# Patient Record
Sex: MALE | Race: OTHER | Employment: FULL TIME | URBAN - METROPOLITAN AREA
[De-identification: names, ages, dates, MRNs, and addresses within clinical notes are randomized per-mention and may not be internally consistent; named-entity substitution may affect disease eponyms.]

---

## 2017-03-03 ENCOUNTER — OFFICE VISIT (OUTPATIENT)
Dept: FAMILY MEDICINE CLINIC | Facility: CLINIC | Age: 42
End: 2017-03-03

## 2017-03-03 VITALS
SYSTOLIC BLOOD PRESSURE: 135 MMHG | BODY MASS INDEX: 30.87 KG/M2 | DIASTOLIC BLOOD PRESSURE: 90 MMHG | HEART RATE: 95 BPM | TEMPERATURE: 98 F | HEIGHT: 67.5 IN | WEIGHT: 199 LBS

## 2017-03-03 DIAGNOSIS — R59.9 REACTIVE LYMPHADENOPATHY: ICD-10-CM

## 2017-03-03 DIAGNOSIS — L73.0 ACNE KELOIDALIS NUCHAE: Primary | ICD-10-CM

## 2017-03-03 PROCEDURE — 99212 OFFICE O/P EST SF 10 MIN: CPT | Performed by: FAMILY MEDICINE

## 2017-03-03 PROCEDURE — 99202 OFFICE O/P NEW SF 15 MIN: CPT | Performed by: FAMILY MEDICINE

## 2017-03-03 RX ORDER — LEVOTHYROXINE SODIUM 0.03 MG/1
25 TABLET ORAL DAILY
COMMUNITY
End: 2017-04-18

## 2017-03-03 RX ORDER — CEPHALEXIN 500 MG/1
500 CAPSULE ORAL 3 TIMES DAILY
Qty: 21 CAPSULE | Refills: 0 | Status: SHIPPED | OUTPATIENT
Start: 2017-03-03 | End: 2017-03-10

## 2017-03-03 NOTE — PROGRESS NOTES
Patient ID: Karilyn Canavan is a 39year old male. HPI  Patient presents with:  Lump: back left side of neck     He states for 4 days he has noticed a lump on the left side of his neck. He states it does not drain.   He has had no fevers or upper respira lb (90.266 kg). ASSESSMENT/PLAN:     Diagnoses and all orders for this visit:    Acne keloidalis nuchae  -     cephALEXin (KEFLEX) 500 MG Oral Cap; Take 1 capsule (500 mg total) by mouth 3 (three) times daily.   -     triamcinolone acetonide 0.1 %

## 2017-04-05 ENCOUNTER — OFFICE VISIT (OUTPATIENT)
Dept: DERMATOLOGY CLINIC | Facility: CLINIC | Age: 42
End: 2017-04-05

## 2017-04-05 DIAGNOSIS — L73.0 ACNE KELOIDALIS NUCHAE: Primary | ICD-10-CM

## 2017-04-05 PROCEDURE — 99212 OFFICE O/P EST SF 10 MIN: CPT | Performed by: DERMATOLOGY

## 2017-04-05 PROCEDURE — 99202 OFFICE O/P NEW SF 15 MIN: CPT | Performed by: DERMATOLOGY

## 2017-04-05 RX ORDER — DOXYCYCLINE HYCLATE 100 MG/1
100 CAPSULE ORAL 2 TIMES DAILY
Qty: 60 CAPSULE | Refills: 6 | Status: SHIPPED | OUTPATIENT
Start: 2017-04-05 | End: 2017-05-05

## 2017-04-05 RX ORDER — CLOBETASOL PROPIONATE 0.46 MG/ML
1 SOLUTION TOPICAL 2 TIMES DAILY
Qty: 50 ML | Refills: 6 | Status: SHIPPED | OUTPATIENT
Start: 2017-04-05 | End: 2017-10-12 | Stop reason: ALTCHOICE

## 2017-04-10 ENCOUNTER — HOSPITAL ENCOUNTER (OUTPATIENT)
Age: 42
Discharge: HOME | End: 2017-04-10
Attending: EMERGENCY MEDICINE
Payer: COMMERCIAL

## 2017-04-10 VITALS
HEART RATE: 94 BPM | WEIGHT: 198 LBS | DIASTOLIC BLOOD PRESSURE: 90 MMHG | TEMPERATURE: 98 F | HEIGHT: 67 IN | SYSTOLIC BLOOD PRESSURE: 127 MMHG | RESPIRATION RATE: 16 BRPM | OXYGEN SATURATION: 97 % | BODY MASS INDEX: 31.08 KG/M2

## 2017-04-10 DIAGNOSIS — J02.0 STREP PHARYNGITIS: Primary | ICD-10-CM

## 2017-04-10 PROCEDURE — 99213 OFFICE O/P EST LOW 20 MIN: CPT

## 2017-04-10 PROCEDURE — 87430 STREP A AG IA: CPT

## 2017-04-10 PROCEDURE — 99204 OFFICE O/P NEW MOD 45 MIN: CPT

## 2017-04-10 RX ORDER — PENICILLIN V POTASSIUM 500 MG/1
500 TABLET ORAL 3 TIMES DAILY
Qty: 30 TABLET | Refills: 0 | Status: SHIPPED | OUTPATIENT
Start: 2017-04-10 | End: 2017-04-20

## 2017-04-10 RX ORDER — DEXAMETHASONE 4 MG/1
8 TABLET ORAL ONCE
Status: COMPLETED | OUTPATIENT
Start: 2017-04-10 | End: 2017-04-10

## 2017-04-10 NOTE — ED PROVIDER NOTES
Patient Seen in: [facility] Immediate Care In [address] [place] [address]    History   Patient presents with:  Cough/URI  Sore Throat    Stated Complaint: Congestion/Sore throat/Ear Pain    HPI    31-year-old male with history of hypothyroidism and presently being t otherwise stated in HPI.     Physical Exam     ED Triage Vitals   BP 04/10/17 1030 127/90 mmHg   Pulse 04/10/17 1030 94   Resp 04/10/17 1030 16   Temp 04/10/17 1030 97.9 °F (36.6 °C)   Temp src 04/10/17 1030 Oral   SpO2 04/10/17 1030 97 %   O2 Device 04/10/

## 2017-04-17 ENCOUNTER — LAB ENCOUNTER (OUTPATIENT)
Dept: LAB | Age: 42
End: 2017-04-17
Attending: FAMILY MEDICINE
Payer: COMMERCIAL

## 2017-04-17 ENCOUNTER — OFFICE VISIT (OUTPATIENT)
Dept: FAMILY MEDICINE CLINIC | Facility: CLINIC | Age: 42
End: 2017-04-17

## 2017-04-17 VITALS
TEMPERATURE: 98 F | DIASTOLIC BLOOD PRESSURE: 90 MMHG | BODY MASS INDEX: 31.29 KG/M2 | WEIGHT: 199.38 LBS | HEIGHT: 67 IN | SYSTOLIC BLOOD PRESSURE: 122 MMHG | HEART RATE: 88 BPM

## 2017-04-17 DIAGNOSIS — E03.9 ACQUIRED HYPOTHYROIDISM: ICD-10-CM

## 2017-04-17 DIAGNOSIS — Z00.00 ADULT GENERAL MEDICAL EXAM: Primary | ICD-10-CM

## 2017-04-17 DIAGNOSIS — Z00.00 ADULT GENERAL MEDICAL EXAM: ICD-10-CM

## 2017-04-17 DIAGNOSIS — Z72.0 TOBACCO USE: ICD-10-CM

## 2017-04-17 PROCEDURE — 99406 BEHAV CHNG SMOKING 3-10 MIN: CPT | Performed by: FAMILY MEDICINE

## 2017-04-17 PROCEDURE — 99396 PREV VISIT EST AGE 40-64: CPT | Performed by: FAMILY MEDICINE

## 2017-04-17 PROCEDURE — 85025 COMPLETE CBC W/AUTO DIFF WBC: CPT

## 2017-04-17 PROCEDURE — 80053 COMPREHEN METABOLIC PANEL: CPT

## 2017-04-17 PROCEDURE — 84443 ASSAY THYROID STIM HORMONE: CPT

## 2017-04-17 PROCEDURE — 36415 COLL VENOUS BLD VENIPUNCTURE: CPT

## 2017-04-17 PROCEDURE — 80061 LIPID PANEL: CPT

## 2017-04-17 NOTE — PROGRESS NOTES
Patient ID: Radha Leon is a 43year old male. HPI  Patient presents with:  Routine Physical    He states 1 pack of cigarettes lasts him for 2-3 days. 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 He has tried gum in the past but it did not help.   He tri Topics   Smoking status: Current Every Day Smoker     Types: Cigarettes    Smokeless tobacco: Never Used    Comment: 6 cigarettes/day    Alcohol Use: Yes  0.0 oz/week    0 Standard drinks or equivalent per week         Comment: occasionally    Drug Use: No 134/90, pulse 88, temperature 97.8 °F (36.6 °C), temperature source Oral, height 5' 7\" (1.702 m), weight 199 lb 6.4 oz (90.447 kg).    04/17/17 0855 04/17/17 0913   BP: 134/90 122/90   Pulse: 88    Temp: 97.8 °F (36.6 °C)    TempSrc: Oral    Height: 5' 7

## 2017-04-17 NOTE — PATIENT INSTRUCTIONS
If in 1 month you are doing well with her Chantix starter pack go ahead and call us and let us know that you want the continuation pack and we will send this to her pharmacy.

## 2017-04-20 ENCOUNTER — TELEPHONE (OUTPATIENT)
Dept: FAMILY MEDICINE CLINIC | Facility: CLINIC | Age: 42
End: 2017-04-20

## 2017-04-20 DIAGNOSIS — E03.9 HYPOTHYROIDISM, UNSPECIFIED TYPE: Primary | ICD-10-CM

## 2017-04-20 NOTE — TELEPHONE ENCOUNTER
Spoke with patient verified date of birth and full name, patient was relayed Dr. Milagro Bustamante below. Pt verbalized understanding and has no questions or concerns at present moment.

## 2017-04-20 NOTE — TELEPHONE ENCOUNTER
----- Message from Rai Vila DO sent at 4/18/2017  8:31 PM CDT -----  The TSH is abnormal at 10.84 so we need to adjust her thyroid medicine and give you a higher dose. Start 75 µg daily of Levoxyl.   My nurse will order a TSH for 2 months from now wi

## 2017-04-23 NOTE — PROGRESS NOTES
Jody Hernandez is a 43year old male. Patient presents with:  Derm Problem: New pt with painful growths at back of scalp. Dx Acne keloidalis nuchae. Pt completed course of keflex in March, which helped. Also using TAC bid. Chronic for 3 years. Education: N/A  Number of Children: N/A     Occupational History  None on file     Social History Main Topics   Smoking status: Current Every Day Smoker     Types: Cigarettes    Smokeless tobacco: Never Used    Comment: 6 cigarettes/day    Alcohol Use: Yes nuchae, consider folliculitis deCalvans in differential.  Chronic management, control discussed. Doxycycline  twice daily X2 weeks and tapering daily for 6-8 weeks. If stable consider tapering every other day.   6 may need to be on this more chronically d

## 2017-06-09 ENCOUNTER — HOSPITAL ENCOUNTER (OUTPATIENT)
Age: 42
Discharge: HOME | End: 2017-06-09
Attending: FAMILY MEDICINE
Payer: COMMERCIAL

## 2017-06-09 ENCOUNTER — APPOINTMENT (OUTPATIENT)
Dept: GENERAL RADIOLOGY | Age: 42
End: 2017-06-09
Attending: FAMILY MEDICINE
Payer: COMMERCIAL

## 2017-06-09 VITALS
DIASTOLIC BLOOD PRESSURE: 90 MMHG | OXYGEN SATURATION: 98 % | RESPIRATION RATE: 16 BRPM | TEMPERATURE: 98 F | HEART RATE: 92 BPM | SYSTOLIC BLOOD PRESSURE: 142 MMHG | HEIGHT: 67 IN | BODY MASS INDEX: 30.61 KG/M2 | WEIGHT: 195 LBS

## 2017-06-09 DIAGNOSIS — M17.12 ARTHRITIS OF LEFT KNEE: Primary | ICD-10-CM

## 2017-06-09 PROCEDURE — 99213 OFFICE O/P EST LOW 20 MIN: CPT

## 2017-06-09 PROCEDURE — 99214 OFFICE O/P EST MOD 30 MIN: CPT

## 2017-06-09 PROCEDURE — 73560 X-RAY EXAM OF KNEE 1 OR 2: CPT | Performed by: FAMILY MEDICINE

## 2017-06-09 RX ORDER — IBUPROFEN 600 MG/1
600 TABLET ORAL EVERY 8 HOURS PRN
Qty: 30 TABLET | Refills: 0 | Status: SHIPPED | OUTPATIENT
Start: 2017-06-09 | End: 2017-06-16

## 2017-06-09 NOTE — ED PROVIDER NOTES
Patient Seen in: [facility] Immediate Care In [place]    History   Patient presents with:  Lower Extremity Injury (musculoskeletal)    Stated Complaint: Knee Pain    HPI    Pt is a 42 yo who presents with left knee pain for 1.5 months.  There was 98%        Physical Exam   Constitutional: He is oriented to person, place, and time. He appears well-developed and well-nourished. Neck: Normal range of motion. Neck supple. Musculoskeletal:   Left knee exam limited secondary to the pain.    Neurologic

## 2017-10-12 ENCOUNTER — OFFICE VISIT (OUTPATIENT)
Dept: FAMILY MEDICINE CLINIC | Facility: CLINIC | Age: 42
End: 2017-10-12

## 2017-10-12 VITALS
TEMPERATURE: 98 F | HEART RATE: 76 BPM | SYSTOLIC BLOOD PRESSURE: 129 MMHG | BODY MASS INDEX: 29 KG/M2 | WEIGHT: 188 LBS | DIASTOLIC BLOOD PRESSURE: 87 MMHG

## 2017-10-12 DIAGNOSIS — S83.207A POSITIVE MCMURRAY SIGN (MENISCUS TEAR) OF LEFT KNEE, INITIAL ENCOUNTER: ICD-10-CM

## 2017-10-12 DIAGNOSIS — M25.362 KNEE BUCKLING, LEFT: ICD-10-CM

## 2017-10-12 DIAGNOSIS — M25.562 ACUTE PAIN OF LEFT KNEE: Primary | ICD-10-CM

## 2017-10-12 PROCEDURE — 99212 OFFICE O/P EST SF 10 MIN: CPT | Performed by: FAMILY MEDICINE

## 2017-10-12 PROCEDURE — 99214 OFFICE O/P EST MOD 30 MIN: CPT | Performed by: FAMILY MEDICINE

## 2017-10-12 RX ORDER — DICLOFENAC SODIUM 75 MG/1
75 TABLET, DELAYED RELEASE ORAL 2 TIMES DAILY
Qty: 42 TABLET | Refills: 1 | Status: SHIPPED | OUTPATIENT
Start: 2017-10-12 | End: 2017-11-02

## 2017-10-12 NOTE — PROGRESS NOTES
Patient ID: Dorothy Martin is a 43year old male.     HPI  Patient presents with:  Knee Pain: left knee    Results   XR KNEE (1 OR 2 VIEWS), LEFT (CPT=73560) (Accession [id number]) (Order [id number])   PACS Images     Show images for XR KNEE (1 OR 2 VIEWS), constant since.       Wt Readings from Last 6 Encounters:  10/12/17 : 188 lb (85.3 kg)  06/09/17 : 195 lb (88.5 kg)  04/17/17 : 199 lb 6.4 oz (90.4 kg)  04/10/17 : 198 lb (89.8 kg)  03/03/17 : 199 lb (90.3 kg)  07/18/11 : 191 lb 8 oz (86.9 kg)      Body mas Diagnoses and all orders for this visit:    Acute pain of left knee  -     MRI KNEE, LEFT (CDK=90521); Future  -     Diclofenac Sodium 75 MG Oral Tab EC; Take 1 tablet (75 mg total) by mouth 2 (two) times daily.  Take with meals. (for pain/inflammation)

## 2017-10-17 ENCOUNTER — TELEPHONE (OUTPATIENT)
Dept: CASE MANAGEMENT | Age: 42
End: 2017-10-17

## 2017-10-17 NOTE — TELEPHONE ENCOUNTER
I think I had enough in the note for them to hopefully authorize it but it may take a day or so. He should reschedule just in case so he does not get stuck with the bill.

## 2017-10-17 NOTE — TELEPHONE ENCOUNTER
Nurse @ [facility] states case is being sent for Medical Review, because [facility] requires 4 wks of physical therapy prior to authorizing MRI/knee. [facility]/Medical Review will have an answer in 2 bz days. Case # [id number].  Pt's MRI is scheduled for tomorrow, 8/18/17, and

## 2017-10-19 ENCOUNTER — TELEPHONE (OUTPATIENT)
Dept: CASE MANAGEMENT | Age: 42
End: 2017-10-19

## 2017-10-23 ENCOUNTER — HOSPITAL ENCOUNTER (OUTPATIENT)
Dept: MRI IMAGING | Age: 42
Discharge: HOME | End: 2017-10-23
Attending: FAMILY MEDICINE
Payer: COMMERCIAL

## 2017-10-23 DIAGNOSIS — M25.362 KNEE BUCKLING, LEFT: ICD-10-CM

## 2017-10-23 DIAGNOSIS — S83.207A POSITIVE MCMURRAY SIGN (MENISCUS TEAR) OF LEFT KNEE, INITIAL ENCOUNTER: ICD-10-CM

## 2017-10-23 DIAGNOSIS — M25.562 ACUTE PAIN OF LEFT KNEE: ICD-10-CM

## 2017-10-23 PROCEDURE — 73721 MRI JNT OF LWR EXTRE W/O DYE: CPT | Performed by: FAMILY MEDICINE

## 2017-11-01 ENCOUNTER — TELEPHONE (OUTPATIENT)
Dept: ADMINISTRATIVE | Age: 42
End: 2017-11-01

## 2017-11-02 ENCOUNTER — TELEPHONE (OUTPATIENT)
Dept: OTHER | Age: 42
End: 2017-11-02

## 2017-11-02 NOTE — TELEPHONE ENCOUNTER
Pt called in to find out how to have the results of his MRI faxed to his insurance company. He was told by his insurance company that he will have to fill out a form so that it can be released.  Pt was transferred to [facility], informed that he may have to go int

## 2017-11-02 NOTE — TELEPHONE ENCOUNTER
Spoke with pt, will come @[facility] to sign form completion request/ hipaa release, informed that fee is $25 to be prepaid - ok.  NK

## 2017-11-07 ENCOUNTER — TELEPHONE (OUTPATIENT)
Dept: FAMILY MEDICINE CLINIC | Facility: CLINIC | Age: 42
End: 2017-11-07

## 2017-11-07 ENCOUNTER — TELEPHONE (OUTPATIENT)
Dept: ADMINISTRATIVE | Age: 42
End: 2017-11-07

## 2017-11-07 NOTE — TELEPHONE ENCOUNTER
[facility] pre-auth'd # [id number] valid 10/18/17 - 12/2/17. Spoke w/pt. Phoned dr buenrostro/pre-auth. Test completed.

## 2017-11-07 NOTE — TELEPHONE ENCOUNTER
Dr. Andrew Bryan,    Please sign off on form:  -Highlight patient  -Hit Chart button  -Open Telephone encounter for 11/7/17  -Scroll to Leonel Sandhu PPD Disability Dr. Andrew Bryan [birth year] and click to open image.  -Hit ACKNOWLEDGE button on bottom right corner and left

## 2017-11-07 NOTE — TELEPHONE ENCOUNTER
Patient in [facility] asking about MRI results done 10/23/17, Pt asking if he can have a note to return back to work with no restrictions or does he still needs to be off due to the tear in the meniscus.   Please advs

## 2017-11-07 NOTE — TELEPHONE ENCOUNTER
Dr. Eder Jerome,    Please sign off on form:  -Highlight patient  -Hit Chart button  -Open Telephone encounter for 11/1/17  -Scroll to Suellen Jerome [birth year] and click to open image.  -Hit ACKNOWLEDGE button on bottom right corner and left-click on plus sign and drag signature to provider line.  -Close window.     Thank you,  [facility]

## 2017-11-07 NOTE — TELEPHONE ENCOUNTER
Dr. Tom Russ,     This is another pt of Dr. Ángel Davenport if you don't mind signing off.      Thank you,  [facility]

## 2017-11-13 ENCOUNTER — OFFICE VISIT (OUTPATIENT)
Dept: ORTHOPEDICS CLINIC | Facility: CLINIC | Age: 42
End: 2017-11-13

## 2017-11-13 DIAGNOSIS — S83.232A COMPLEX TEAR OF MEDIAL MENISCUS OF LEFT KNEE AS CURRENT INJURY, INITIAL ENCOUNTER: Primary | ICD-10-CM

## 2017-11-13 PROCEDURE — 99212 OFFICE O/P EST SF 10 MIN: CPT | Performed by: ORTHOPAEDIC SURGERY

## 2017-11-13 PROCEDURE — 99203 OFFICE O/P NEW LOW 30 MIN: CPT | Performed by: ORTHOPAEDIC SURGERY

## 2017-11-13 NOTE — H&P
Chief Complaint: Left knee pain    NURSING INTAKE COMMENTS: Patient presents with:  Knee Pain: Left - onset 4-5 mo ago - no injury - has pain and had swelling, has an MRI in the system - still has pain rated as 5-9/10 at all the time, no numbness or tingli Stability Testing        Anterior Drawer Negative Negative      Posterior Drawer Negative Negative      Lachman Negative Negative      Pivot Shift Negative Negative      Posterolateral corner Negative Negative        Varus Stress        0 Degrees Negativ

## 2017-11-21 ENCOUNTER — TELEPHONE (OUTPATIENT)
Dept: PODIATRY CLINIC | Facility: CLINIC | Age: 42
End: 2017-11-21

## 2017-11-21 NOTE — TELEPHONE ENCOUNTER
Pt is for out patient surgery with  Dr. Sophia Goldberg  Surgery date 12-7-17  REcieved information yesterday for surgery authorization  Left knee scope  Location [place]  CPT 74261  Diagnosis code [phone]    Call to [facility] to [place]

## 2017-11-29 ENCOUNTER — TELEPHONE (OUTPATIENT)
Dept: ORTHOPEDICS CLINIC | Facility: CLINIC | Age: 42
End: 2017-11-29

## 2017-12-01 ENCOUNTER — TELEPHONE (OUTPATIENT)
Dept: ORTHOPEDICS CLINIC | Facility: CLINIC | Age: 42
End: 2017-12-01

## 2017-12-01 DIAGNOSIS — S83.232A COMPLEX TEAR OF MEDIAL MENISCUS OF LEFT KNEE AS CURRENT INJURY, INITIAL ENCOUNTER: Primary | ICD-10-CM

## 2017-12-01 NOTE — TELEPHONE ENCOUNTER
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 Records faxed to [employer], scanned, will see if form still needed.  NK

## 2017-12-01 NOTE — TELEPHONE ENCOUNTER
Call on insurance for authorization'  Told case was cancelled.   Was told the insurnace tried to reach out to me on 11-28-17 and the primary care office on surgery referral .    Surgery information given again   New reference number  [id number]    Surgery o

## 2017-12-05 NOTE — TELEPHONE ENCOUNTER
Call from [place] in insurance verification Discussed insurance case. With [facility] cancelling the case. And the Re submitting of the case and re faxing of the [facility] [phone]    Call to [facility].    Spoke to rafal

## 2017-12-06 NOTE — TELEPHONE ENCOUNTER
No return information today for surgery authorization  Call to [facility] to representatives Mackenzie Carlin. Continued to be transferred from dept  To dept. REquested to speak to supervisor - Spoke to [facility] [place] Reyez supervisor.

## 2017-12-07 ENCOUNTER — HOSPITAL ENCOUNTER (OUTPATIENT)
Facility: HOSPITAL | Age: 42
Setting detail: HOSPITAL OUTPATIENT SURGERY
Discharge: HOME | End: 2017-12-07
Attending: ORTHOPAEDIC SURGERY | Admitting: ORTHOPAEDIC SURGERY
Payer: COMMERCIAL

## 2017-12-07 ENCOUNTER — ANESTHESIA (OUTPATIENT)
Dept: SURGERY | Facility: HOSPITAL | Age: 42
End: 2017-12-07
Payer: COMMERCIAL

## 2017-12-07 ENCOUNTER — ANESTHESIA EVENT (OUTPATIENT)
Dept: SURGERY | Facility: HOSPITAL | Age: 42
End: 2017-12-07
Payer: COMMERCIAL

## 2017-12-07 ENCOUNTER — SURGERY (OUTPATIENT)
Age: 42
End: 2017-12-07

## 2017-12-07 VITALS
HEART RATE: 72 BPM | HEIGHT: 68 IN | WEIGHT: 197.13 LBS | SYSTOLIC BLOOD PRESSURE: 152 MMHG | TEMPERATURE: 98 F | DIASTOLIC BLOOD PRESSURE: 88 MMHG | RESPIRATION RATE: 12 BRPM | BODY MASS INDEX: 29.88 KG/M2 | OXYGEN SATURATION: 96 %

## 2017-12-07 DIAGNOSIS — E03.9 ACQUIRED HYPOTHYROIDISM: ICD-10-CM

## 2017-12-07 DIAGNOSIS — S83.232A COMPLEX TEAR OF MEDIAL MENISCUS OF LEFT KNEE AS CURRENT INJURY, INITIAL ENCOUNTER: ICD-10-CM

## 2017-12-07 PROCEDURE — 0SBD4ZZ EXCISION OF LEFT KNEE JOINT, PERCUTANEOUS ENDOSCOPIC APPROACH: ICD-10-PCS | Performed by: ORTHOPAEDIC SURGERY

## 2017-12-07 RX ORDER — HYDROMORPHONE HYDROCHLORIDE 1 MG/ML
0.2 INJECTION, SOLUTION INTRAMUSCULAR; INTRAVENOUS; SUBCUTANEOUS EVERY 5 MIN PRN
Status: DISCONTINUED | OUTPATIENT
Start: 2017-12-07 | End: 2017-12-07

## 2017-12-07 RX ORDER — HYDROCODONE BITARTRATE AND ACETAMINOPHEN 5; 325 MG/1; MG/1
1-2 TABLET ORAL EVERY 6 HOURS PRN
Qty: 20 TABLET | Refills: 0 | Status: SHIPPED | OUTPATIENT
Start: 2017-12-07 | End: 2017-12-17

## 2017-12-07 RX ORDER — ONDANSETRON 2 MG/ML
4 INJECTION INTRAMUSCULAR; INTRAVENOUS ONCE AS NEEDED
Status: DISCONTINUED | OUTPATIENT
Start: 2017-12-07 | End: 2017-12-07

## 2017-12-07 RX ORDER — FAMOTIDINE 20 MG/1
20 TABLET ORAL ONCE
Status: DISCONTINUED | OUTPATIENT
Start: 2017-12-07 | End: 2017-12-07 | Stop reason: HOSPADM

## 2017-12-07 RX ORDER — METOCLOPRAMIDE 10 MG/1
10 TABLET ORAL ONCE
Status: DISCONTINUED | OUTPATIENT
Start: 2017-12-07 | End: 2017-12-07 | Stop reason: HOSPADM

## 2017-12-07 RX ORDER — NALOXONE HYDROCHLORIDE 0.4 MG/ML
80 INJECTION, SOLUTION INTRAMUSCULAR; INTRAVENOUS; SUBCUTANEOUS AS NEEDED
Status: DISCONTINUED | OUTPATIENT
Start: 2017-12-07 | End: 2017-12-07

## 2017-12-07 RX ORDER — MORPHINE SULFATE 2 MG/ML
2 INJECTION, SOLUTION INTRAMUSCULAR; INTRAVENOUS EVERY 10 MIN PRN
Status: DISCONTINUED | OUTPATIENT
Start: 2017-12-07 | End: 2017-12-07

## 2017-12-07 RX ORDER — SODIUM CHLORIDE, SODIUM LACTATE, POTASSIUM CHLORIDE, CALCIUM CHLORIDE 600; 310; 30; 20 MG/100ML; MG/100ML; MG/100ML; MG/100ML
INJECTION, SOLUTION INTRAVENOUS CONTINUOUS
Status: DISCONTINUED | OUTPATIENT
Start: 2017-12-07 | End: 2017-12-07

## 2017-12-07 RX ORDER — ACETAMINOPHEN 500 MG
1000 TABLET ORAL ONCE
Status: COMPLETED | OUTPATIENT
Start: 2017-12-07 | End: 2017-12-07

## 2017-12-07 RX ORDER — HYDROMORPHONE HYDROCHLORIDE 1 MG/ML
0.6 INJECTION, SOLUTION INTRAMUSCULAR; INTRAVENOUS; SUBCUTANEOUS EVERY 5 MIN PRN
Status: DISCONTINUED | OUTPATIENT
Start: 2017-12-07 | End: 2017-12-07

## 2017-12-07 RX ORDER — HYDROCODONE BITARTRATE AND ACETAMINOPHEN 5; 325 MG/1; MG/1
1 TABLET ORAL AS NEEDED
Status: DISCONTINUED | OUTPATIENT
Start: 2017-12-07 | End: 2017-12-07

## 2017-12-07 RX ORDER — MORPHINE SULFATE 4 MG/ML
4 INJECTION, SOLUTION INTRAMUSCULAR; INTRAVENOUS EVERY 10 MIN PRN
Status: DISCONTINUED | OUTPATIENT
Start: 2017-12-07 | End: 2017-12-07

## 2017-12-07 RX ORDER — HALOPERIDOL 5 MG/ML
0.25 INJECTION INTRAMUSCULAR ONCE AS NEEDED
Status: DISCONTINUED | OUTPATIENT
Start: 2017-12-07 | End: 2017-12-07

## 2017-12-07 RX ORDER — HYDROMORPHONE HYDROCHLORIDE 1 MG/ML
0.4 INJECTION, SOLUTION INTRAMUSCULAR; INTRAVENOUS; SUBCUTANEOUS EVERY 5 MIN PRN
Status: DISCONTINUED | OUTPATIENT
Start: 2017-12-07 | End: 2017-12-07

## 2017-12-07 RX ORDER — MORPHINE SULFATE 10 MG/ML
6 INJECTION, SOLUTION INTRAMUSCULAR; INTRAVENOUS EVERY 10 MIN PRN
Status: DISCONTINUED | OUTPATIENT
Start: 2017-12-07 | End: 2017-12-07

## 2017-12-07 RX ORDER — HYDROCODONE BITARTRATE AND ACETAMINOPHEN 5; 325 MG/1; MG/1
1-2 TABLET ORAL EVERY 6 HOURS PRN
Qty: 40 TABLET | Refills: 0 | Status: SHIPPED | OUTPATIENT
Start: 2017-12-07 | End: 2017-12-07

## 2017-12-07 RX ORDER — HYDROCODONE BITARTRATE AND ACETAMINOPHEN 5; 325 MG/1; MG/1
2 TABLET ORAL AS NEEDED
Status: DISCONTINUED | OUTPATIENT
Start: 2017-12-07 | End: 2017-12-07

## 2017-12-07 RX ORDER — ASPIRIN 325 MG
325 TABLET ORAL DAILY
Qty: 14 TABLET | Refills: 0 | Status: SHIPPED | OUTPATIENT
Start: 2017-12-07 | End: 2018-04-20

## 2017-12-07 RX ORDER — CEFAZOLIN SODIUM/WATER 2 G/20 ML
2 SYRINGE (ML) INTRAVENOUS ONCE
Status: COMPLETED | OUTPATIENT
Start: 2017-12-07 | End: 2017-12-07

## 2017-12-07 RX ORDER — BUPIVACAINE HYDROCHLORIDE AND EPINEPHRINE 5; 5 MG/ML; UG/ML
INJECTION, SOLUTION PERINEURAL AS NEEDED
Status: DISCONTINUED | OUTPATIENT
Start: 2017-12-07 | End: 2017-12-07 | Stop reason: HOSPADM

## 2017-12-07 RX ADMIN — SODIUM CHLORIDE, SODIUM LACTATE, POTASSIUM CHLORIDE, CALCIUM CHLORIDE: 600; 310; 30; 20 INJECTION, SOLUTION INTRAVENOUS at 10:01:00

## 2017-12-07 RX ADMIN — CEFAZOLIN SODIUM/WATER 2 G: 2 G/20 ML SYRINGE (ML) INTRAVENOUS at 10:15:00

## 2017-12-07 RX ADMIN — SODIUM CHLORIDE, SODIUM LACTATE, POTASSIUM CHLORIDE, CALCIUM CHLORIDE: 600; 310; 30; 20 INJECTION, SOLUTION INTRAVENOUS at 10:45:00

## 2017-12-07 NOTE — OPERATIVE REPORT
[facility]    PATIENT'S NAME: Williedestinee Rosalie   ATTENDING PHYSICIAN: Tato Gonzales MD   OPERATING PHYSICIAN: Tato Gonzales MD   PATIENT ACCOUNT#:   [id number]    LOCATION:  [address]  MEDICAL RECORD #:   [id number]       DATE OF suprapatellar pouch or either of the gutters. ACL and PCL were intact to probing. Lateral meniscus was intact.   No significant articular cartilage damage was noted except for some mild grade 2 outer bridge type changes to the patellar chondral surface as

## 2017-12-07 NOTE — ANESTHESIA POSTPROCEDURE EVALUATION
Patient: Jasmine Kim    Procedure Summary     Date:  12/07/17 Room / Location:  [facility] OR  / [facility] OR    Anesthesia Start:  100 Anesthesia Stop:  9173    Procedure:  KNEE ARTHROSCOPY (Left Knee) Diagnosis:       Complex tear of medial meniscus of

## 2017-12-07 NOTE — BRIEF OP NOTE
Pre-Operative Diagnosis: Complex tear of medial meniscus of left knee as current injury, initial encounter [X47.160A]     Post-Operative Diagnosis: Complex tear of medial meniscus of left knee as current injury, initial encounter [N80.725A]     Procedur

## 2017-12-07 NOTE — INTERVAL H&P NOTE
Pre-op Diagnosis: Complex tear of medial meniscus of left knee as current injury, initial encounter [S83.232A]    The above referenced H&P was reviewed by Ronda Vazquez MD on 12/7/2017, the patient was examined and no significant changes have occurre

## 2017-12-07 NOTE — ANESTHESIA PREPROCEDURE EVALUATION
Anesthesia PreOp Note    HPI:     Dolly Moss is a 43year old male who presents for preoperative consultation requested by:  Mertha Schwab, MD    Date of Surgery: 12/7/2017    Procedure(s):  KNEE ARTHROSCOPY  Indication: Complex tear of medial meni Problem Relation Age of Onset   • Diabetes Mother        Social History  Social History   Marital status:   Spouse name: N/A    Years of education: N/A  Number of children: N/A     Occupational History  None on file     Social History Main Topics questions were answered to the best of my ability. The patient desires the anesthetic management as planned.   Peyman Almaraz  12/7/2017 10:19 AM

## 2017-12-11 ENCOUNTER — TELEPHONE (OUTPATIENT)
Dept: ORTHOPEDICS CLINIC | Facility: CLINIC | Age: 42
End: 2017-12-11

## 2017-12-11 NOTE — TELEPHONE ENCOUNTER
Dr. Lexa Cordero,    Please sign off on form: disability 12-11-17  -Highlight the patient and hit \"Chart\" button.   -In Chart Review, w/in the Encounter tab - open the Telephone call encounter for_PPD disability Dr. Gutierrez Province down.  -Click Jose Angel Cortes

## 2017-12-11 NOTE — TELEPHONE ENCOUNTER
Pt requesting a letter for work that states when his return to work date might be. Pt would like to pick letter up in office when ready. Please call.

## 2017-12-12 ENCOUNTER — TELEPHONE (OUTPATIENT)
Dept: ORTHOPEDICS CLINIC | Facility: CLINIC | Age: 42
End: 2017-12-12

## 2017-12-12 NOTE — TELEPHONE ENCOUNTER
Dr. Marci Nolan,    Can you see it now?    -In Chart Review, w/in the Encounter tab - open the Telephone call encounter for_11/29/17. Scroll down.  -Click \"scan on\" blue Hyperlink under \"Media\" heading for _PPD Disab. ofrm Dr. Marci Nolan 12/12/17.

## 2017-12-13 NOTE — TELEPHONE ENCOUNTER
Form for the [employer] (STD) completed signed by Dr. James Roy faxed to the [facility] on 12-13-17.   Copies mld to pt SHA GARCIA faxed PHI to the [employer] on 12-1-17 copies will be s canned  sha

## 2017-12-13 NOTE — TELEPHONE ENCOUNTER
Form completed signed by Dr. Mark Ingram faxed to [address] [place] [address] on 12-. Copies mld to pt MELODY      PHI faxed to [employer] on 12-1-2017 by NN in [facility].

## 2017-12-20 NOTE — TELEPHONE ENCOUNTER
12/11/17 pt paid for Dr. Lino rodgers. [facility] faxed to [address] [place], [place] [address]brandie Alvarado, copy mailed to pt. Call completed.  JOSE

## 2017-12-21 ENCOUNTER — OFFICE VISIT (OUTPATIENT)
Dept: ORTHOPEDICS CLINIC | Facility: CLINIC | Age: 42
End: 2017-12-21

## 2017-12-21 VITALS — RESPIRATION RATE: 16 BRPM | HEART RATE: 80 BPM | SYSTOLIC BLOOD PRESSURE: 128 MMHG | DIASTOLIC BLOOD PRESSURE: 78 MMHG

## 2017-12-21 DIAGNOSIS — S83.232A COMPLEX TEAR OF MEDIAL MENISCUS OF LEFT KNEE AS CURRENT INJURY, INITIAL ENCOUNTER: Primary | ICD-10-CM

## 2017-12-21 PROCEDURE — 99212 OFFICE O/P EST SF 10 MIN: CPT | Performed by: ORTHOPAEDIC SURGERY

## 2017-12-21 PROCEDURE — 99024 POSTOP FOLLOW-UP VISIT: CPT | Performed by: ORTHOPAEDIC SURGERY

## 2017-12-21 NOTE — PROGRESS NOTES
Per verbal order from Dr. Wilson Phillip remove patients sutures. sutures were removed and incision looks well approximated and no redness or discharge noted.

## 2017-12-21 NOTE — PROGRESS NOTES
NURSING INTAKE COMMENTS: Patient presents with:  Post-Op: Left MMT - 1st visit - had sx on 12/7/17 - states he stil has some pain rated as 5-6/10 on and off, no numbness or tingling, has still some swelling on the upper aspect of the knee.       Patient pre

## 2018-01-08 ENCOUNTER — TELEPHONE (OUTPATIENT)
Dept: ORTHOPEDICS CLINIC | Facility: CLINIC | Age: 43
End: 2018-01-08

## 2018-02-13 ENCOUNTER — HOSPITAL ENCOUNTER (OUTPATIENT)
Age: 43
Discharge: HOME | End: 2018-02-13
Attending: EMERGENCY MEDICINE
Payer: COMMERCIAL

## 2018-02-13 ENCOUNTER — APPOINTMENT (OUTPATIENT)
Dept: GENERAL RADIOLOGY | Age: 43
End: 2018-02-13
Attending: EMERGENCY MEDICINE
Payer: COMMERCIAL

## 2018-02-13 VITALS
BODY MASS INDEX: 31.07 KG/M2 | TEMPERATURE: 98 F | HEART RATE: 84 BPM | RESPIRATION RATE: 18 BRPM | OXYGEN SATURATION: 97 % | DIASTOLIC BLOOD PRESSURE: 85 MMHG | HEIGHT: 68 IN | SYSTOLIC BLOOD PRESSURE: 142 MMHG | WEIGHT: 205 LBS

## 2018-02-13 DIAGNOSIS — J40 BRONCHITIS: Primary | ICD-10-CM

## 2018-02-13 PROCEDURE — 99214 OFFICE O/P EST MOD 30 MIN: CPT

## 2018-02-13 PROCEDURE — 99213 OFFICE O/P EST LOW 20 MIN: CPT

## 2018-02-13 PROCEDURE — 71046 X-RAY EXAM CHEST 2 VIEWS: CPT | Performed by: EMERGENCY MEDICINE

## 2018-02-13 RX ORDER — AZITHROMYCIN 250 MG/1
TABLET, FILM COATED ORAL
Qty: 1 PACKAGE | Refills: 0 | Status: SHIPPED | OUTPATIENT
Start: 2018-02-13 | End: 2018-02-18

## 2018-02-13 NOTE — ED PROVIDER NOTES
Patient presents with:  Cough/URI      HPI:     Radu Valenzuela is a 43year old male who presents for evaluation of a chief complaint of  a cough Onset of symptoms was 5 days ago. The patient also complains of fever and posttussive vomiting.          Patient / Reason for Exam?          Answer: vomiting, sob, fever      azithromycin (ZITHROMAX Z-JOHN) 250 MG Oral Tab          Si mg once followed by 250 mg daily x 4 days          Dispense:  1 Package          Refill:  0  Xr Chest Pa + Lat Chest (cpt=71046)

## 2018-02-13 NOTE — ED NOTES
Increase po fluids rest motrin or tylenol for aches and pain fever.  Call and make appointment with pcp in office for one week go to the ed for new or worse pain fever shortness of breath chest pain

## 2018-02-13 NOTE — ED INITIAL ASSESSMENT (HPI)
Shortness of breath cough vomiting after cough since Friday some soft stool no fever + smoker  Easy non labored resp speech clear.  No flu shot

## 2018-04-20 ENCOUNTER — OFFICE VISIT (OUTPATIENT)
Dept: FAMILY MEDICINE CLINIC | Facility: CLINIC | Age: 43
End: 2018-04-20

## 2018-04-20 VITALS
WEIGHT: 197.38 LBS | HEART RATE: 98 BPM | TEMPERATURE: 98 F | BODY MASS INDEX: 30.98 KG/M2 | SYSTOLIC BLOOD PRESSURE: 142 MMHG | DIASTOLIC BLOOD PRESSURE: 78 MMHG | HEIGHT: 67 IN | RESPIRATION RATE: 16 BRPM

## 2018-04-20 DIAGNOSIS — E03.9 ACQUIRED HYPOTHYROIDISM: ICD-10-CM

## 2018-04-20 DIAGNOSIS — F17.200 TOBACCO USE DISORDER: ICD-10-CM

## 2018-04-20 DIAGNOSIS — Z00.00 ADULT GENERAL MEDICAL EXAM: Primary | ICD-10-CM

## 2018-04-20 DIAGNOSIS — E66.09 NON MORBID OBESITY DUE TO EXCESS CALORIES: ICD-10-CM

## 2018-04-20 DIAGNOSIS — R03.0 ELEVATED BLOOD PRESSURE READING: ICD-10-CM

## 2018-04-20 DIAGNOSIS — R73.9 HYPERGLYCEMIA: ICD-10-CM

## 2018-04-20 PROCEDURE — 99406 BEHAV CHNG SMOKING 3-10 MIN: CPT | Performed by: FAMILY MEDICINE

## 2018-04-20 PROCEDURE — 99396 PREV VISIT EST AGE 40-64: CPT | Performed by: FAMILY MEDICINE

## 2018-04-20 RX ORDER — BUPROPION HYDROCHLORIDE 150 MG/1
TABLET, EXTENDED RELEASE ORAL
Qty: 60 TABLET | Refills: 2 | Status: ON HOLD | OUTPATIENT
Start: 2018-04-20 | End: 2018-06-28

## 2018-04-20 NOTE — PROGRESS NOTES
Patient ID: Alka Mcmahan is a 37year old male. HPI  Patient presents with:  Routine Physical    He smokes one half pack per day. He states he tried Chantix in the past without help. He tried the gum but is not a \"gum person\".   He states he is eng Geraldine Quiroz  No results found for: EAG, A1C    No results found for: MALBP, CREUR, CREAURINE, MIALBURINE, MCRRATIOUR, MALBCRECALC, MICROALBUMIN, CREAUR, MALBCREACALC      Lab Results  Component Value Date   CHOLEST 158 04/17/2017   TRIG 134 04/17/2017 easily. Psychiatric/Behavioral: Positive for sleep disturbance. Negative for dysphoric mood and hallucinations. The patient is not nervous/anxious.           Past Medical History:   Diagnosis Date   • Disorder of thyroid    • Thyroid condition        Past No cranial nerve deficit. Skin: Skin is warm and dry. No rash noted. Psychiatric: He has a normal mood and affect. Vitals reviewed. Blood pressure 142/90, pulse 98, temperature 98.2 °F (36.8 °C), temperature source Oral, resp.  rate 16, height 5' 7 or tablet as it is free and helps greatly with calorie counting to help you stay on track. If you don't like counting calories can try [email or web address] com where foods and drinks are given a specific amount of points and you will be allowed a certain

## 2018-04-21 ENCOUNTER — LAB ENCOUNTER (OUTPATIENT)
Dept: LAB | Age: 43
End: 2018-04-21
Attending: FAMILY MEDICINE
Payer: COMMERCIAL

## 2018-04-21 ENCOUNTER — APPOINTMENT (OUTPATIENT)
Dept: LAB | Age: 43
End: 2018-04-21
Attending: FAMILY MEDICINE
Payer: COMMERCIAL

## 2018-04-21 DIAGNOSIS — Z00.00 ADULT GENERAL MEDICAL EXAM: ICD-10-CM

## 2018-04-21 DIAGNOSIS — R73.9 HYPERGLYCEMIA: ICD-10-CM

## 2018-04-21 DIAGNOSIS — E03.9 ACQUIRED HYPOTHYROIDISM: ICD-10-CM

## 2018-04-21 DIAGNOSIS — E66.09 NON MORBID OBESITY DUE TO EXCESS CALORIES: ICD-10-CM

## 2018-04-21 DIAGNOSIS — R03.0 ELEVATED BLOOD PRESSURE READING: ICD-10-CM

## 2018-04-21 PROCEDURE — 85025 COMPLETE CBC W/AUTO DIFF WBC: CPT

## 2018-04-21 PROCEDURE — 80061 LIPID PANEL: CPT

## 2018-04-21 PROCEDURE — 80050 GENERAL HEALTH PANEL: CPT

## 2018-04-21 PROCEDURE — 93010 ELECTROCARDIOGRAM REPORT: CPT | Performed by: FAMILY MEDICINE

## 2018-04-21 PROCEDURE — 84443 ASSAY THYROID STIM HORMONE: CPT

## 2018-04-21 PROCEDURE — 83036 HEMOGLOBIN GLYCOSYLATED A1C: CPT

## 2018-04-21 PROCEDURE — 93005 ELECTROCARDIOGRAM TRACING: CPT

## 2018-04-21 PROCEDURE — 36415 COLL VENOUS BLD VENIPUNCTURE: CPT

## 2018-06-28 ENCOUNTER — HOSPITAL ENCOUNTER (OUTPATIENT)
Facility: HOSPITAL | Age: 43
Setting detail: OBSERVATION
Discharge: HOME | End: 2018-06-29
Attending: EMERGENCY MEDICINE | Admitting: HOSPITALIST
Payer: COMMERCIAL

## 2018-06-28 ENCOUNTER — APPOINTMENT (OUTPATIENT)
Dept: GENERAL RADIOLOGY | Facility: HOSPITAL | Age: 43
End: 2018-06-28
Attending: EMERGENCY MEDICINE
Payer: COMMERCIAL

## 2018-06-28 ENCOUNTER — HOSPITAL ENCOUNTER (OUTPATIENT)
Age: 43
Discharge: ANOTHER HOSPITAL | End: 2018-06-28
Attending: FAMILY MEDICINE
Payer: COMMERCIAL

## 2018-06-28 VITALS
RESPIRATION RATE: 16 BRPM | DIASTOLIC BLOOD PRESSURE: 87 MMHG | HEART RATE: 89 BPM | OXYGEN SATURATION: 95 % | SYSTOLIC BLOOD PRESSURE: 135 MMHG | TEMPERATURE: 98 F

## 2018-06-28 DIAGNOSIS — R07.9 ACUTE CHEST PAIN: Primary | ICD-10-CM

## 2018-06-28 DIAGNOSIS — R06.02 SHORTNESS OF BREATH: ICD-10-CM

## 2018-06-28 PROBLEM — R73.9 HYPERGLYCEMIA: Status: ACTIVE | Noted: 2018-06-28

## 2018-06-28 LAB
ANION GAP SERPL CALC-SCNC: 8 MMOL/L (ref 0–18)
BACTERIA UR QL AUTO: NEGATIVE /HPF
BASOPHILS # BLD: 0.1 K/UL (ref 0–0.2)
BASOPHILS NFR BLD: 1 %
BILIRUB UR QL: NEGATIVE
BUN SERPL-MCNC: 10 MG/DL (ref 8–20)
BUN/CREAT SERPL: 11.8 (ref 10–20)
CALCIUM SERPL-MCNC: 8.8 MG/DL (ref 8.5–10.5)
CHLORIDE SERPL-SCNC: 110 MMOL/L (ref 95–110)
CLARITY UR: CLEAR
CO2 SERPL-SCNC: 22 MMOL/L (ref 22–32)
COLOR UR: YELLOW
CREAT SERPL-MCNC: 0.85 MG/DL (ref 0.5–1.5)
D DIMER PPP FEU-MCNC: <0.27 MCG/ML (ref ?–0.5)
EOSINOPHIL # BLD: 0.2 K/UL (ref 0–0.7)
EOSINOPHIL NFR BLD: 3 %
ERYTHROCYTE [DISTWIDTH] IN BLOOD BY AUTOMATED COUNT: 14.4 % (ref 11–15)
GLUCOSE SERPL-MCNC: 106 MG/DL (ref 70–99)
GLUCOSE UR-MCNC: NEGATIVE MG/DL
HCT VFR BLD AUTO: 45.8 % (ref 41–52)
HGB BLD-MCNC: 15 G/DL (ref 13.5–17.5)
HGB UR QL STRIP.AUTO: NEGATIVE
KETONES UR-MCNC: NEGATIVE MG/DL
LYMPHOCYTES # BLD: 2.5 K/UL (ref 1–4)
LYMPHOCYTES NFR BLD: 35 %
MCH RBC QN AUTO: 28.1 PG (ref 27–32)
MCHC RBC AUTO-ENTMCNC: 32.8 G/DL (ref 32–37)
MCV RBC AUTO: 85.8 FL (ref 80–100)
MONOCYTES # BLD: 0.7 K/UL (ref 0–1)
MONOCYTES NFR BLD: 9 %
NEUTROPHILS # BLD AUTO: 3.8 K/UL (ref 1.8–7.7)
NEUTROPHILS NFR BLD: 52 %
NITRITE UR QL STRIP.AUTO: NEGATIVE
OSMOLALITY UR CALC.SUM OF ELEC: 289 MOSM/KG (ref 275–295)
PH UR: 5 [PH] (ref 5–8)
PLATELET # BLD AUTO: 244 K/UL (ref 140–400)
PMV BLD AUTO: 9.4 FL (ref 7.4–10.3)
POTASSIUM SERPL-SCNC: 4.3 MMOL/L (ref 3.3–5.1)
PROT UR-MCNC: NEGATIVE MG/DL
RBC # BLD AUTO: 5.34 M/UL (ref 4.5–5.9)
RBC #/AREA URNS AUTO: 1 /HPF
SODIUM SERPL-SCNC: 140 MMOL/L (ref 136–144)
SP GR UR STRIP: 1.02 (ref 1–1.03)
TROPONIN I SERPL-MCNC: 0.01 NG/ML (ref ?–0.03)
UROBILINOGEN UR STRIP-ACNC: 4
VIT C UR-MCNC: NEGATIVE MG/DL
WBC # BLD AUTO: 7.2 K/UL (ref 4–11)
WBC #/AREA URNS AUTO: 1 /HPF

## 2018-06-28 PROCEDURE — 93005 ELECTROCARDIOGRAM TRACING: CPT

## 2018-06-28 PROCEDURE — 99214 OFFICE O/P EST MOD 30 MIN: CPT

## 2018-06-28 PROCEDURE — 71045 X-RAY EXAM CHEST 1 VIEW: CPT | Performed by: EMERGENCY MEDICINE

## 2018-06-28 PROCEDURE — 99219 INITIAL OBSERVATION CARE,LEVL II: CPT | Performed by: HOSPITALIST

## 2018-06-28 PROCEDURE — 93010 ELECTROCARDIOGRAM REPORT: CPT | Performed by: FAMILY MEDICINE

## 2018-06-28 RX ORDER — HYDROCODONE BITARTRATE AND ACETAMINOPHEN 5; 325 MG/1; MG/1
1 TABLET ORAL EVERY 6 HOURS PRN
Status: DISCONTINUED | OUTPATIENT
Start: 2018-06-28 | End: 2018-06-29

## 2018-06-28 RX ORDER — ONDANSETRON 2 MG/ML
4 INJECTION INTRAMUSCULAR; INTRAVENOUS EVERY 6 HOURS PRN
Status: DISCONTINUED | OUTPATIENT
Start: 2018-06-28 | End: 2018-06-29

## 2018-06-28 RX ORDER — ASPIRIN 81 MG/1
324 TABLET, CHEWABLE ORAL ONCE
Status: COMPLETED | OUTPATIENT
Start: 2018-06-28 | End: 2018-06-28

## 2018-06-28 RX ORDER — ACETAMINOPHEN 325 MG/1
650 TABLET ORAL EVERY 6 HOURS PRN
Status: DISCONTINUED | OUTPATIENT
Start: 2018-06-28 | End: 2018-06-29

## 2018-06-28 RX ORDER — HEPARIN SODIUM 5000 [USP'U]/ML
5000 INJECTION, SOLUTION INTRAVENOUS; SUBCUTANEOUS EVERY 12 HOURS SCHEDULED
Status: DISCONTINUED | OUTPATIENT
Start: 2018-06-29 | End: 2018-06-29

## 2018-06-28 RX ORDER — HEPARIN SODIUM 5000 [USP'U]/ML
5000 INJECTION, SOLUTION INTRAVENOUS; SUBCUTANEOUS EVERY 12 HOURS
Status: DISCONTINUED | OUTPATIENT
Start: 2018-06-28 | End: 2018-06-28

## 2018-06-28 NOTE — ED PROVIDER NOTES
Patient Seen in: [facility] Immediate Care In [place]    History   CC:  Patient presents with:  Cough/URI    Stated Complaint: cough    ------------------------------  Per Rn: (paraphrase)    Coughing since last night, some SOB  ----------------- clear   Neck:   Supple, symmetrical, trachea midline, few normal, reactive acln's;     thyroid:  no enlargement/tenderness/nodules; no carotid    bruit or JVD   Heart   S1 S2 w/RRR; describes tightness across sternum, bilaterally   Lungs:     Clear to ausc

## 2018-06-29 ENCOUNTER — APPOINTMENT (OUTPATIENT)
Dept: CV DIAGNOSTICS | Facility: HOSPITAL | Age: 43
End: 2018-06-29
Attending: HOSPITALIST
Payer: COMMERCIAL

## 2018-06-29 VITALS
HEIGHT: 67 IN | WEIGHT: 185.69 LBS | BODY MASS INDEX: 29.15 KG/M2 | TEMPERATURE: 98 F | HEART RATE: 76 BPM | DIASTOLIC BLOOD PRESSURE: 74 MMHG | RESPIRATION RATE: 18 BRPM | OXYGEN SATURATION: 96 % | SYSTOLIC BLOOD PRESSURE: 136 MMHG

## 2018-06-29 LAB — TROPONIN I SERPL-MCNC: 0.01 NG/ML (ref ?–0.03)

## 2018-06-29 PROCEDURE — 93017 CV STRESS TEST TRACING ONLY: CPT | Performed by: HOSPITALIST

## 2018-06-29 PROCEDURE — 93350 STRESS TTE ONLY: CPT | Performed by: HOSPITALIST

## 2018-06-29 PROCEDURE — 99217 OBSERVATION CARE DISCHARGE: CPT | Performed by: HOSPITALIST

## 2018-06-29 RX ORDER — PANTOPRAZOLE SODIUM 40 MG/1
40 TABLET, DELAYED RELEASE ORAL
Qty: 30 TABLET | Refills: 1 | Status: SHIPPED | OUTPATIENT
Start: 2018-06-29 | End: 2018-08-09

## 2018-06-29 RX ORDER — PANTOPRAZOLE SODIUM 40 MG/1
40 TABLET, DELAYED RELEASE ORAL
Status: DISCONTINUED | OUTPATIENT
Start: 2018-06-29 | End: 2018-06-29

## 2018-06-29 NOTE — DISCHARGE SUMMARY
[facility]    Discharge Summary    Dolly Moss Patient Status:  Observation    3/25/1975 MRN [id number]   Location [facility]5W Attending Jonatan Stafford MD   Hosp Day # 0 PCP Piotr Mcgraw DO     Date of Admission:  Condition:  Good    Discharge Medications:      Discharge Medications      START taking these medications      Instructions Prescription details   Pantoprazole Sodium 40 MG Tbec  Commonly known as:  PROTONIX      Take 1 tablet (40 mg total) by mouth every

## 2018-06-29 NOTE — ED NOTES
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 States the pain is middle of his chest, feels like a tightness and discomfort. States pain does not radiated anywhere else.  [facility] here to rule out heart i

## 2018-06-29 NOTE — PLAN OF CARE
Problem: Patient/Family Goals  Goal: Patient/Family Long Term Goal  Patient's Long Term Goal: to stay out of the hospital    Interventions:  - follow plan of care  - See additional Care Plan goals for specific interventions   Outcome: Progressing    Goal:

## 2018-06-29 NOTE — H&P
[address] Patient Status:  Observation    3/25/1975 MRN [id number]   Location [facility] Attending Lio Foster MD   Hosp Day # 0 PCP Nicole Flores DO     Date:  2018  Date of Ad Negative. Integumentary:  Negative. Neurologic:  Negative. Psychiatric:  Negative.   ROS reviewed as documented in chart    Physical Exam:  Temp:  [97.8 °F (36.6 °C)-98.2 °F (36.8 °C)] 97.8 °F (36.6 °C)  Pulse:  [67-89] 67  Resp:  [16-18] 18  BP: (133-1 to quit. Refusing nicotine patch at this time.     Hypothyroidism  Continue Synthroid    Prophylaxis  Subcutaneous heparin    CODE STATUS  Full    Primary care physician  Merced Renae,     Disposition  Clinical course will dictate outcome      Rama Harper

## 2018-06-29 NOTE — ED PROVIDER NOTES
Patient Seen in: [facility] Emergency Department    History   No chief complaint on file.     Stated Complaint: sent from [facility] for chest pain     HPI    Patient presents emergency department after being seen in the immediate care center for chest rosenda Effort normal and breath sounds normal. No respiratory distress. Abdominal: Soft. He exhibits no distension. There is no tenderness. Musculoskeletal: Normal range of motion. He exhibits no tenderness.    Neurological: He is alert and oriented to person, abnormality.    Dictated by (CST): Krystle Perez MD on 6/28/2018 at 19:40     Approved by (CST): Krystle Perez MD on 6/28/2018 at 19:40            Radiology exams  Viewed and reviewed by myself and findings discussed with patient including need for follow up

## 2018-07-03 ENCOUNTER — TELEPHONE (OUTPATIENT)
Dept: FAMILY MEDICINE CLINIC | Facility: CLINIC | Age: 43
End: 2018-07-03

## 2018-07-10 ENCOUNTER — OFFICE VISIT (OUTPATIENT)
Dept: FAMILY MEDICINE CLINIC | Facility: CLINIC | Age: 43
End: 2018-07-10

## 2018-07-10 ENCOUNTER — LAB ENCOUNTER (OUTPATIENT)
Dept: LAB | Age: 43
End: 2018-07-10
Attending: FAMILY MEDICINE
Payer: COMMERCIAL

## 2018-07-10 VITALS
WEIGHT: 190.19 LBS | SYSTOLIC BLOOD PRESSURE: 127 MMHG | TEMPERATURE: 98 F | HEIGHT: 67 IN | DIASTOLIC BLOOD PRESSURE: 85 MMHG | BODY MASS INDEX: 29.85 KG/M2 | HEART RATE: 90 BPM | RESPIRATION RATE: 14 BRPM

## 2018-07-10 DIAGNOSIS — K21.9 GASTROESOPHAGEAL REFLUX DISEASE, ESOPHAGITIS PRESENCE NOT SPECIFIED: ICD-10-CM

## 2018-07-10 DIAGNOSIS — E03.9 ACQUIRED HYPOTHYROIDISM: ICD-10-CM

## 2018-07-10 DIAGNOSIS — R07.89 ATYPICAL CHEST PAIN: Primary | ICD-10-CM

## 2018-07-10 DIAGNOSIS — F17.200 TOBACCO USE DISORDER: ICD-10-CM

## 2018-07-10 LAB — TSH SERPL-ACNC: 14.66 UIU/ML (ref 0.45–5.33)

## 2018-07-10 PROCEDURE — 99406 BEHAV CHNG SMOKING 3-10 MIN: CPT | Performed by: FAMILY MEDICINE

## 2018-07-10 PROCEDURE — 84443 ASSAY THYROID STIM HORMONE: CPT

## 2018-07-10 PROCEDURE — 99214 OFFICE O/P EST MOD 30 MIN: CPT | Performed by: FAMILY MEDICINE

## 2018-07-10 PROCEDURE — 99212 OFFICE O/P EST SF 10 MIN: CPT | Performed by: FAMILY MEDICINE

## 2018-07-10 PROCEDURE — 36415 COLL VENOUS BLD VENIPUNCTURE: CPT

## 2018-07-10 RX ORDER — NICOTINE 21 MG/24HR
1 PATCH, TRANSDERMAL 24 HOURS TRANSDERMAL EVERY 24 HOURS
Qty: 28 PATCH | Refills: 1 | Status: SHIPPED | OUTPATIENT
Start: 2018-07-10 | End: 2019-04-25

## 2018-07-10 NOTE — PROGRESS NOTES
Patient ID: Nuris Serrano is a 37year old male.     HPI  Patient presents with:  Hospital F/U: chest pains     Discharge Summaries  Date of Service: 6/29/2018 2:15 PM  Tyesha Chen MD   HOSPITALIST      []Manual[]Template  []Copied     [place] Castell[place] Possibly GERD.   Home with rx for protonix.     Tobacco abuse  Patient counseled encouraged to quit.  Refusing nicotine patch at this time.     Hypothyroidism  Continue Synthroid     Prophylaxis  Subcutaneous heparin     CODE STATUS  Full     Consultations: Past Medical History:   Diagnosis Date   • Disorder of thyroid    • Thyroid condition        Past Surgical History:  No date: CHOLECYSTECTOMY  No date: KNEE SURGERY Left      Comment: meniscus removed  No date: SHOULDER ARTHROSCOPY       Current Outpatient -     ASSAY, THYROID STIM HORMONE; Future  They did not do a thyroid test done at the hospital  Tobacco use disorder  -     BEHAV CHNG SMOKING GR THAN 3 UP TO 10 MIN  -     nicotine (NICODERM CQ) 21 MG/24HR Transdermal Patch 24 Hr; Place 1 patch onto the s

## 2018-07-18 NOTE — PROGRESS NOTES
Aware of results and recommendations. Patient verbalized understanding and agreeable with plan. No further questions at this time. He will contact pharmacy to follow up on his new prescription sent on 7/12 for Levothyroxine Sodium 150 MCG Oral Tab.  Order g

## 2018-08-09 ENCOUNTER — OFFICE VISIT (OUTPATIENT)
Dept: FAMILY MEDICINE CLINIC | Facility: CLINIC | Age: 43
End: 2018-08-09
Payer: COMMERCIAL

## 2018-08-09 VITALS
SYSTOLIC BLOOD PRESSURE: 138 MMHG | HEIGHT: 67 IN | WEIGHT: 192 LBS | DIASTOLIC BLOOD PRESSURE: 92 MMHG | HEART RATE: 87 BPM | BODY MASS INDEX: 30.13 KG/M2

## 2018-08-09 DIAGNOSIS — R03.0 ELEVATED BLOOD PRESSURE READING WITHOUT DIAGNOSIS OF HYPERTENSION: ICD-10-CM

## 2018-08-09 DIAGNOSIS — M79.672 BILATERAL FOOT PAIN: ICD-10-CM

## 2018-08-09 DIAGNOSIS — M79.671 BILATERAL FOOT PAIN: ICD-10-CM

## 2018-08-09 DIAGNOSIS — Z72.0 TOBACCO USE: ICD-10-CM

## 2018-08-09 DIAGNOSIS — B35.3 TINEA PEDIS OF BOTH FEET: Primary | ICD-10-CM

## 2018-08-09 PROCEDURE — 99212 OFFICE O/P EST SF 10 MIN: CPT | Performed by: NURSE PRACTITIONER

## 2018-08-09 PROCEDURE — 99213 OFFICE O/P EST LOW 20 MIN: CPT | Performed by: NURSE PRACTITIONER

## 2018-08-09 RX ORDER — KETOCONAZOLE 20 MG/G
CREAM TOPICAL
Qty: 60 G | Refills: 3 | Status: SHIPPED | OUTPATIENT
Start: 2018-08-09

## 2018-08-09 RX ORDER — NAPROXEN 500 MG/1
500 TABLET ORAL 2 TIMES DAILY WITH MEALS
Qty: 60 TABLET | Refills: 0 | Status: SHIPPED | OUTPATIENT
Start: 2018-08-09 | End: 2018-09-15

## 2018-08-09 NOTE — ASSESSMENT & PLAN NOTE
Wash feet-may rinse with water 1 c  1/4 c white vinegar-then rinse again-dry feet with blow dryer set on cool and then apply fungal cream    Use fungal cream 5 days after all redness has resolved.

## 2018-09-06 ENCOUNTER — LAB ENCOUNTER (OUTPATIENT)
Dept: LAB | Age: 43
End: 2018-09-06
Attending: FAMILY MEDICINE
Payer: COMMERCIAL

## 2018-09-06 ENCOUNTER — OFFICE VISIT (OUTPATIENT)
Dept: FAMILY MEDICINE CLINIC | Facility: CLINIC | Age: 43
End: 2018-09-06
Payer: COMMERCIAL

## 2018-09-06 VITALS
HEART RATE: 90 BPM | BODY MASS INDEX: 29.66 KG/M2 | WEIGHT: 189 LBS | SYSTOLIC BLOOD PRESSURE: 138 MMHG | HEIGHT: 67 IN | TEMPERATURE: 98 F | DIASTOLIC BLOOD PRESSURE: 88 MMHG

## 2018-09-06 DIAGNOSIS — R03.0 ELEVATED BLOOD PRESSURE READING WITHOUT DIAGNOSIS OF HYPERTENSION: ICD-10-CM

## 2018-09-06 DIAGNOSIS — E03.9 ACQUIRED HYPOTHYROIDISM: ICD-10-CM

## 2018-09-06 DIAGNOSIS — R73.9 HYPERGLYCEMIA: ICD-10-CM

## 2018-09-06 DIAGNOSIS — Z72.0 TOBACCO USE: ICD-10-CM

## 2018-09-06 DIAGNOSIS — E03.9 ACQUIRED HYPOTHYROIDISM: Primary | ICD-10-CM

## 2018-09-06 LAB
HBA1C MFR BLD: 6 % (ref 4–6)
TSH SERPL-ACNC: 10.34 UIU/ML (ref 0.45–5.33)

## 2018-09-06 PROCEDURE — 84443 ASSAY THYROID STIM HORMONE: CPT

## 2018-09-06 PROCEDURE — 99212 OFFICE O/P EST SF 10 MIN: CPT | Performed by: FAMILY MEDICINE

## 2018-09-06 PROCEDURE — 99214 OFFICE O/P EST MOD 30 MIN: CPT | Performed by: FAMILY MEDICINE

## 2018-09-06 PROCEDURE — 36415 COLL VENOUS BLD VENIPUNCTURE: CPT

## 2018-09-06 PROCEDURE — 83036 HEMOGLOBIN GLYCOSYLATED A1C: CPT

## 2018-09-06 RX ORDER — LEVOTHYROXINE SODIUM 175 UG/1
175 TABLET ORAL DAILY
Qty: 90 TABLET | Refills: 0 | Status: SHIPPED | OUTPATIENT
Start: 2018-09-06 | End: 2019-04-25

## 2018-09-06 NOTE — PROGRESS NOTES
Patient ID: Jeremy Brooks is a 37year old male. HPI  Patient presents with:  Blood Pressure: high     We upped his levothyroxine to 150 mcg as his TSH was too high. He is here to recheck his blood pressure as well.     He has been trying to cut down o 06/28/2018   BILUR Negative 06/28/2018   KETUR Negative 06/28/2018   BLOODURINE Negative 06/28/2018   PHURINE 5.0 06/28/2018   PROUR Negative 06/28/2018   UROBILINOGEN 4.0 (A) 06/28/2018   NITRITE Negative 06/28/2018   LEUUR Trace (A) 06/28/2018   WBCUR 1 Prescriptions:  naproxen 500 MG Oral Tab Take 1 tablet (500 mg total) by mouth 2 (two) times daily with meals. Disp: 60 tablet Rfl: 0   Levothyroxine Sodium 150 MCG Oral Tab Take 1 tablet (150 mcg total) by mouth daily.  Disp: 90 tablet Rfl: 0   nicotine (N HEMOGLOBIN A1C; Future        Referrals (if applicable)  No orders of the defined types were placed in this encounter. Follow up if symptoms persist.  Take medicine (if given) as prescribed.   Approach to treatment discussed and patient/family member

## 2018-09-15 DIAGNOSIS — M79.672 BILATERAL FOOT PAIN: ICD-10-CM

## 2018-09-15 DIAGNOSIS — M79.671 BILATERAL FOOT PAIN: ICD-10-CM

## 2018-09-15 RX ORDER — NAPROXEN 500 MG/1
TABLET ORAL
Qty: 60 TABLET | Refills: 0 | Status: SHIPPED | OUTPATIENT
Start: 2018-09-15 | End: 2018-10-16

## 2018-09-15 NOTE — PROGRESS NOTES
Pt informed of lab result & MD recommendation, pt stated understanding. Pt call transferred to . Rx filled by MD.   No future appointments.

## 2018-09-16 NOTE — TELEPHONE ENCOUNTER
Refill Protocol Appointment Criteria  · Appointment scheduled in the past 6 months or in the next 3 months  Recent Outpatient Visits            1 week ago Acquired hypothyroidism    [address], [place], [place]    Office Visit

## 2018-10-16 ENCOUNTER — HOSPITAL ENCOUNTER (OUTPATIENT)
Age: 43
Discharge: HOME | End: 2018-10-16
Attending: EMERGENCY MEDICINE
Payer: COMMERCIAL

## 2018-10-16 VITALS
TEMPERATURE: 98 F | RESPIRATION RATE: 16 BRPM | OXYGEN SATURATION: 99 % | WEIGHT: 187 LBS | DIASTOLIC BLOOD PRESSURE: 92 MMHG | BODY MASS INDEX: 29 KG/M2 | SYSTOLIC BLOOD PRESSURE: 135 MMHG | HEART RATE: 77 BPM

## 2018-10-16 DIAGNOSIS — R19.7 DIARRHEA, UNSPECIFIED TYPE: Primary | ICD-10-CM

## 2018-10-16 PROCEDURE — 99214 OFFICE O/P EST MOD 30 MIN: CPT

## 2018-10-16 PROCEDURE — 80047 BASIC METABLC PNL IONIZED CA: CPT

## 2018-10-16 PROCEDURE — 85025 COMPLETE CBC W/AUTO DIFF WBC: CPT | Performed by: EMERGENCY MEDICINE

## 2018-10-16 PROCEDURE — 96360 HYDRATION IV INFUSION INIT: CPT

## 2018-10-16 RX ORDER — SODIUM CHLORIDE 9 MG/ML
1000 INJECTION, SOLUTION INTRAVENOUS ONCE
Status: COMPLETED | OUTPATIENT
Start: 2018-10-16 | End: 2018-10-16

## 2018-10-16 NOTE — ED INITIAL ASSESSMENT (HPI)
Pt states since Friday he has had vomiting and diarrhea. Pt states the vomiting subsided. Last emesis was Sunday. No blood in the stool or emesis. +fever and chills at home.  +body aches. +abd cramping.

## 2018-10-16 NOTE — ED PROVIDER NOTES
Patient Seen in: [facility] Immediate Care In [address] [place] [address]    History   Patient presents with:  Nausea/Vomiting/Diarrhea (gastrointestinal)    Stated Complaint: VOMITING/DIARRHEA/body aches    HPI    36 yo male with five days of diarrhea, multiple epi Normocephalic and atraumatic. Dry mucous membranes   Eyes: Conjunctivae and EOM are normal. Pupils are equal, round, and reactive to light. Neck: Normal range of motion. Neck supple.    Cardiovascular: Normal rate, regular rhythm, normal heart sounds an

## 2018-10-17 ENCOUNTER — APPOINTMENT (OUTPATIENT)
Dept: LAB | Age: 43
End: 2018-10-17
Attending: EMERGENCY MEDICINE
Payer: COMMERCIAL

## 2018-10-17 DIAGNOSIS — R19.7 DIARRHEA, UNSPECIFIED TYPE: ICD-10-CM

## 2018-10-17 PROCEDURE — 87507 IADNA-DNA/RNA PROBE TQ 12-25: CPT

## 2019-04-18 ENCOUNTER — HOSPITAL ENCOUNTER (OUTPATIENT)
Age: 44
Discharge: HOME | End: 2019-04-18
Attending: EMERGENCY MEDICINE
Payer: COMMERCIAL

## 2019-04-18 VITALS
HEART RATE: 94 BPM | HEIGHT: 68 IN | SYSTOLIC BLOOD PRESSURE: 139 MMHG | DIASTOLIC BLOOD PRESSURE: 89 MMHG | TEMPERATURE: 98 F | RESPIRATION RATE: 18 BRPM | BODY MASS INDEX: 28.04 KG/M2 | WEIGHT: 185 LBS | OXYGEN SATURATION: 98 %

## 2019-04-18 DIAGNOSIS — J06.9 UPPER RESPIRATORY TRACT INFECTION, UNSPECIFIED TYPE: ICD-10-CM

## 2019-04-18 DIAGNOSIS — H66.92 LEFT OTITIS MEDIA, UNSPECIFIED OTITIS MEDIA TYPE: Primary | ICD-10-CM

## 2019-04-18 PROCEDURE — 99213 OFFICE O/P EST LOW 20 MIN: CPT

## 2019-04-18 PROCEDURE — 99214 OFFICE O/P EST MOD 30 MIN: CPT

## 2019-04-18 PROCEDURE — 87430 STREP A AG IA: CPT

## 2019-04-18 RX ORDER — AMOXICILLIN 875 MG/1
875 TABLET, COATED ORAL 2 TIMES DAILY
Qty: 14 TABLET | Refills: 0 | Status: SHIPPED | OUTPATIENT
Start: 2019-04-18 | End: 2019-04-25

## 2019-04-18 NOTE — ED PROVIDER NOTES
Patient Seen in: [facility] Immediate Care In [address] [place] [address]    History   Patient presents with:  Cough/URI    Stated Complaint: ear pain, cough    HPI  Patient complains of 2 days of runny nose, cough, postnasal drip and left ear pain.   No change in he atraumatic. Right Ear: External ear normal. Tympanic membrane is erythematous. A middle ear effusion is present. Left Ear: Tympanic membrane and external ear normal.   Nose: Rhinorrhea present.    Mouth/Throat: Uvula is midline and mucous membranes are

## 2019-04-18 NOTE — ED NOTES
C/o lt ear pain x 3-4 days. Scratchy throat started yesterday and he lost his voice today. No nasal drainage at this point.

## 2019-04-25 ENCOUNTER — LAB ENCOUNTER (OUTPATIENT)
Dept: LAB | Age: 44
End: 2019-04-25
Attending: FAMILY MEDICINE
Payer: COMMERCIAL

## 2019-04-25 ENCOUNTER — OFFICE VISIT (OUTPATIENT)
Dept: FAMILY MEDICINE CLINIC | Facility: CLINIC | Age: 44
End: 2019-04-25
Payer: COMMERCIAL

## 2019-04-25 VITALS
TEMPERATURE: 97 F | HEART RATE: 92 BPM | WEIGHT: 194.38 LBS | BODY MASS INDEX: 30.51 KG/M2 | HEIGHT: 67 IN | SYSTOLIC BLOOD PRESSURE: 124 MMHG | DIASTOLIC BLOOD PRESSURE: 83 MMHG | RESPIRATION RATE: 16 BRPM

## 2019-04-25 DIAGNOSIS — Z72.0 TOBACCO USE: ICD-10-CM

## 2019-04-25 DIAGNOSIS — E03.9 ACQUIRED HYPOTHYROIDISM: ICD-10-CM

## 2019-04-25 DIAGNOSIS — H65.02 NON-RECURRENT ACUTE SEROUS OTITIS MEDIA OF LEFT EAR: ICD-10-CM

## 2019-04-25 DIAGNOSIS — H91.92 DECREASED HEARING, LEFT: ICD-10-CM

## 2019-04-25 DIAGNOSIS — H92.02 LEFT EAR PAIN: Primary | ICD-10-CM

## 2019-04-25 PROCEDURE — 99212 OFFICE O/P EST SF 10 MIN: CPT | Performed by: FAMILY MEDICINE

## 2019-04-25 PROCEDURE — 84443 ASSAY THYROID STIM HORMONE: CPT

## 2019-04-25 PROCEDURE — 36415 COLL VENOUS BLD VENIPUNCTURE: CPT

## 2019-04-25 PROCEDURE — 99214 OFFICE O/P EST MOD 30 MIN: CPT | Performed by: FAMILY MEDICINE

## 2019-04-25 RX ORDER — PREDNISONE 20 MG/1
TABLET ORAL
Qty: 10 TABLET | Refills: 0 | Status: SHIPPED | OUTPATIENT
Start: 2019-04-25

## 2019-04-25 RX ORDER — AZELASTINE 1 MG/ML
2 SPRAY, METERED NASAL 2 TIMES DAILY
Qty: 1 BOTTLE | Refills: 0 | Status: SHIPPED | OUTPATIENT
Start: 2019-04-25

## 2019-04-25 RX ORDER — LEVOTHYROXINE SODIUM 175 UG/1
175 TABLET ORAL DAILY
Qty: 90 TABLET | Refills: 0 | Status: SHIPPED | OUTPATIENT
Start: 2019-04-25

## 2019-04-25 NOTE — PROGRESS NOTES
Patient ID: Clovis Mccarthy is a 40year old male. 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  He is having problems with insurance covering the increased dos of thyroid    • Thyroid condition        Past Surgical History:   Procedure Laterality Date   • CHOLECYSTECTOMY     • KNEE ARTHROSCOPY Left 12/7/2017    Performed by Zechariah Beverly MD at [address] [place] [address] OR   • KNEE SURGERY Left     meniscus removed   • ANGELICA Oral    Weight: 194 lb 6.4 oz (88.2 kg)    Height: 5' 7\" (1.702 m)             ASSESSMENT/PLAN:   Diagnoses and all orders for this visit:    Left ear pain  -     Azelastine HCl 0.1 % Nasal Solution; 2 sprays by Nasal route 2 (two) times daily.  Squeeze no

## 2019-05-01 ENCOUNTER — TELEPHONE (OUTPATIENT)
Dept: OTHER | Age: 44
End: 2019-05-01

## 2019-05-01 DIAGNOSIS — E03.9 HYPOTHYROIDISM, UNSPECIFIED TYPE: Primary | ICD-10-CM

## 2019-05-01 NOTE — PROGRESS NOTES
LMTCB=transfer to triage. 3rd attempt,Will send NO PHONE RESPONSE MAIL today. Left message to patient's friend (Deanna-see demographic). MyChart message sent.   Spoke to Jayant Abbott's pharmacist, stated that they have the same number on file,patient pick

## (undated) DEVICE — ZIMMER® STERILE DISPOSABLE TOURNIQUET CUFF WITH PLC, DUAL PORT, SINGLE BLADDER, 30 IN. (76 CM)

## (undated) DEVICE — GOWN SURG AERO BLUE PERF XLG

## (undated) DEVICE — STERILE LATEX POWDER-FREE SURGICAL GLOVESWITH NITRILE COATING: Brand: PROTEXIS

## (undated) DEVICE — STERILE POLYISOPRENE POWDER-FREE SURGICAL GLOVES: Brand: PROTEXIS

## (undated) DEVICE — SUCTION CANISTER, 3000CC,SAFELINER: Brand: DEROYAL

## (undated) DEVICE — SUTURE PDS II 2-0 CT-2

## (undated) DEVICE — ABDOMINAL PAD: Brand: CURITY

## (undated) DEVICE — SUTURE ETHILON 3-0 669H

## (undated) DEVICE — ARTHROSCOPY: Brand: MEDLINE INDUSTRIES, INC.

## (undated) DEVICE — SOL  .9 1000ML BTL

## (undated) DEVICE — CHLORAPREP 26ML APPLICATOR

## (undated) DEVICE — SOL  .9 3000ML

## (undated) DEVICE — 3 ML SYRINGE LUER-LOCK TIP: Brand: MONOJECT

## (undated) DEVICE — DRAPE SRG 70X60IN SPLT U IMPRV

## (undated) DEVICE — BLADE SHVR COOLCUT 13CM 4MM

## (undated) DEVICE — 20 ML SYRINGE LUER-LOCK TIP: Brand: MONOJECT

## (undated) DEVICE — TUBING IRR 16FT CNT WV 3 ASCP

## (undated) DEVICE — 3M™ STERI-DRAPE™ U-DRAPE 1015: Brand: STERI-DRAPE™

## (undated) NOTE — LETTER
5/1/2019              Neftaly Potter        [address] [place] [address] [address]        Keyshawn Sanches         Dear Melissa Wilburn,    This letter is to inform you that our office has made several attempts to reach you by phone without success.   We were attempting to contact yo

## (undated) NOTE — LETTER
11/13/2017          To Whom It May Concern:    Polly Bella is currently under my medical care and may return to work at this time. However, activity is restricted as follows: light duty, no climbing, no lifting over 20 lbs.  and no prolonged standing

## (undated) NOTE — LETTER
8/9/2018              Neftaly Potter        [address] [place] [address] [address] [place] [id number]     To Whom It May Concern:    Sonal Montoya was seen in our office today for on-going fungal infection to bilateral feet.   He states that he is constantly working

## (undated) NOTE — LETTER
Date & Time: 4/18/2019, 12:09 PM  Patient: Jeremy Brooks  Encounter Provider(s):    Radha Esqueda MD       To Whom It May Concern:    Jeremy Brooks was seen and treated in our department on 4/18/2019.  He should not return to work until Monday April 22

## (undated) NOTE — MR AVS SNAPSHOT
[facility] SPECIALTY [facility] [place] [address] [address]  [id number] [phone]               Thank you for choosing us for your health care visit with Dat Alvarez MD.  We are glad to serve you and happy to provide you with this summary of visit,  view other health information, and more. To sign up or find more information, go to [email or web address] and click on the Sign Up Now link in the [employer] box.      Enter your [employer] Activation Code exactly as it appears below along with yo

## (undated) NOTE — LETTER
12/11/2017          To Whom It May Concern:    Fish Mayorga is currently under my medical care. I would expect return to work to be at approximately January 4, 2018. If you require additional information please contact our office.         Sincerely,

## (undated) NOTE — ED AVS SNAPSHOT
[facility] AND [facility] Immediate Care in [facility].  [address] [facility]    Phone:  [phone]    Fax:  [phone]   MRN: [id number]    Department:  [facility] AND [facility] Immediate Care in [address] [place] [address]   Date of Visit:  4 other new symptoms develop. Discharge References/Attachments     PHARYNGITIS, STREP (CONFIRMED) (ENGLISH)      Disclosure     Insurance plans vary and the physician(s) referred by the Immediate Care may not be covered by your plan.   It is possible that [email or web address].    IF THERE IS ANY CHANGE OR WORSENING OF YOUR CONDITION, CALL YOUR PRIMARY CARE PHYSICIAN AT ONCE OR GO TO THE EMERGENCY DEPARTMENT.     If you have been prescribed any medication(s), please fill your prescription right away and begin merari you to explore options for quitting.     - If you have concerns related to behavioral health issues or thoughts of harming yourself, contact [facility] at [phone].     - If you don’t have insurance, Corinne Gibson

## (undated) NOTE — LETTER
Date & Time: 10/16/2018, 1:44 PM  Patient: Radu Valenzuela  Encounter Provider(s):    Martie Goltz, MD       To Whom It May Concern:    Radu Valenzuela was seen and treated in our department on 10/16/2018. He should not return to work until 10/19/18.

## (undated) NOTE — ED AVS SNAPSHOT
[facility] AND [facility] Immediate Care in [place] [address].  [place] [address]    Phone:  [phone]    Fax:  [phone]   MRN: [id number]    Department:  [facility] AND [facility] Immediate Care in [address] [place] [address]   Date of Visit:  6 may not be covered by your plan. It is possible that the physician may not participate in your health insurance plan. This may result in a lower benefit level being available to you or other limited reimbursement.   The physician may seek payment directly If you have been prescribed any medication(s), please fill your prescription right away and begin taking the medication(s) as directed.   If you believe that any of the medications or instructions on this list is different from what your Primary Care doctor harming yourself, contact [facility] at [phone]. - If you don’t have insurance, Corinne Gibson has partnered with Patient [address] to help you get signed up for insurance coverage.   Patient [place]

## (undated) NOTE — LETTER
1/8/2018          To Whom It May Concern:    Nathaneil Habermann is currently under my medical care. He may return to work on 1/8/18 with no restrictions. If you require additional information please contact our office.         Sincerely,    Annalee Simmons

## (undated) NOTE — LETTER
[address]  [place] [address]  Dept: [phone]  Dept Fax: [phone]  Loc: [phone]      April 10, 2017    Patient: Rebekah Kinsey   Date of Visit: 4/10/2017       To Whom It May Concern:    Neftaly

## (undated) NOTE — MR AVS SNAPSHOT
[address]  [address] [facility]  [phone]               Thank you for choosing us for your health care visit with Sage Dominguez DO.   We are glad to serve you and happy to provide you with this summary Assoc Dx:  Acne keloidalis nuchae [L73.0]          Reason for Today's Visit     Lump           Medical Issues Discussed Today     Acne keloidalis nuchae    -  Primary    Reactive lymphadenopathy          Instructions and Information about Your Health Your unique OneClass Access Code: [email or web address]  Expires: 5/2/2017  3:43 PM    If you have questions, you can call [phone] to talk to our [facility] Staff. Remember, OneClass is NOT to be used for urgent needs. For medical emergencies, dial 911. Water is best for hydration Fast food. Eat at home when possible     Tips for increasing your physical activity – Adults who are physically active are less likely to develop some chronic diseases than adults who are inactive.      HOW TO GET STARTED: HOW

## (undated) NOTE — LETTER
[address]  [facility]  Dept: [phone]  Dept Fax: [phone]  Loc: [phone]      February 13, 2018    Patient: Tea Loya   Date of Visit: 2/13/2018       To Whom It May Concern:    [place]

## (undated) NOTE — MR AVS SNAPSHOT
[address]  [address] [facility]  [phone]               Thank you for choosing us for your health care visit with Anika Joe DO.   We are glad to serve you and happy to provide you with this summary Doxycycline Hyclate 100 MG Caps   Take 1 capsule (100 mg total) by mouth 2 (two) times daily. Commonly known as:  VIBRAMYCIN           Levothyroxine Sodium 25 MCG Tabs   Take 25 mcg by mouth daily.    Commonly known as:  SYNTHROID, LEVOTHROID           p

## (undated) NOTE — LETTER
10/12/2017              eNftaly Potter        [address] [place] [address] [address] [place] [address]         To whom it may concern,    Rebekah Kinsey is currently a patient under my medical care. Patient is quite a bit of pain of his left knee. It is swollen.   I s

## (undated) NOTE — LETTER
[address]   [address]  [phone]    11/13/17          Tamiko Hanna DO  [address]  [address] [facility] Manny Mcgarry          Patient: Atlee Mo   Date of Birth